# Patient Record
Sex: FEMALE | URBAN - METROPOLITAN AREA
[De-identification: names, ages, dates, MRNs, and addresses within clinical notes are randomized per-mention and may not be internally consistent; named-entity substitution may affect disease eponyms.]

---

## 2022-09-06 ENCOUNTER — APPOINTMENT (OUTPATIENT)
Dept: URBAN - METROPOLITAN AREA CLINIC 259 | Age: 47
Setting detail: DERMATOLOGY
End: 2022-09-06

## 2022-09-06 DIAGNOSIS — Z85.828 PERSONAL HISTORY OF OTHER MALIGNANT NEOPLASM OF SKIN: ICD-10-CM

## 2022-09-06 DIAGNOSIS — L81.4 OTHER MELANIN HYPERPIGMENTATION: ICD-10-CM

## 2022-09-06 DIAGNOSIS — D18.0 HEMANGIOMA: ICD-10-CM

## 2022-09-06 DIAGNOSIS — L57.8 OTHER SKIN CHANGES DUE TO CHRONIC EXPOSURE TO NONIONIZING RADIATION: ICD-10-CM

## 2022-09-06 DIAGNOSIS — L82.1 OTHER SEBORRHEIC KERATOSIS: ICD-10-CM

## 2022-09-06 DIAGNOSIS — D22 MELANOCYTIC NEVI: ICD-10-CM

## 2022-09-06 DIAGNOSIS — Z71.89 OTHER SPECIFIED COUNSELING: ICD-10-CM

## 2022-09-06 PROBLEM — D18.01 HEMANGIOMA OF SKIN AND SUBCUTANEOUS TISSUE: Status: ACTIVE | Noted: 2022-09-06

## 2022-09-06 PROBLEM — D22.5 MELANOCYTIC NEVI OF TRUNK: Status: ACTIVE | Noted: 2022-09-06

## 2022-09-06 PROCEDURE — OTHER COUNSELING: OTHER

## 2022-09-06 PROCEDURE — OTHER MIPS QUALITY: OTHER

## 2022-09-06 PROCEDURE — 99203 OFFICE O/P NEW LOW 30 MIN: CPT

## 2022-09-06 ASSESSMENT — LOCATION DETAILED DESCRIPTION DERM
LOCATION DETAILED: INFERIOR THORACIC SPINE
LOCATION DETAILED: RIGHT SUPERIOR MEDIAL UPPER BACK
LOCATION DETAILED: LEFT PROXIMAL PRETIBIAL REGION
LOCATION DETAILED: LEFT MEDIAL UPPER BACK
LOCATION DETAILED: LEFT INFERIOR MEDIAL UPPER BACK

## 2022-09-06 ASSESSMENT — LOCATION SIMPLE DESCRIPTION DERM
LOCATION SIMPLE: RIGHT UPPER BACK
LOCATION SIMPLE: LEFT UPPER BACK
LOCATION SIMPLE: LEFT PRETIBIAL REGION
LOCATION SIMPLE: UPPER BACK

## 2022-09-06 ASSESSMENT — LOCATION ZONE DERM
LOCATION ZONE: LEG
LOCATION ZONE: TRUNK

## 2022-09-06 NOTE — HPI: FULL BODY SKIN EXAMINATION
How Severe Are Your Spot(S)?: mild
What Type Of Note Output Would You Prefer (Optional)?: Standard Output
What Is The Reason For Today's Visit?: Full Body Skin Examination
What Is The Reason For Today's Visit? (Being Monitored For X): the development of new lesions
Additional History: Pt states she has some new spots, but declines tenderness or bleeding.

## 2023-03-06 PROCEDURE — 88305 TISSUE EXAM BY PATHOLOGIST: CPT | Mod: TC,ORL | Performed by: SPECIALIST

## 2023-03-06 PROCEDURE — 88305 TISSUE EXAM BY PATHOLOGIST: CPT | Mod: 26 | Performed by: PATHOLOGY

## 2023-03-07 ENCOUNTER — LAB REQUISITION (OUTPATIENT)
Dept: LAB | Facility: CLINIC | Age: 48
End: 2023-03-07
Payer: COMMERCIAL

## 2023-03-08 LAB
PATH REPORT.COMMENTS IMP SPEC: NORMAL
PATH REPORT.COMMENTS IMP SPEC: NORMAL
PATH REPORT.FINAL DX SPEC: NORMAL
PATH REPORT.GROSS SPEC: NORMAL
PATH REPORT.MICROSCOPIC SPEC OTHER STN: NORMAL
PATH REPORT.RELEVANT HX SPEC: NORMAL
PHOTO IMAGE: NORMAL

## 2025-04-11 ENCOUNTER — TRANSFERRED RECORDS (OUTPATIENT)
Dept: HEALTH INFORMATION MANAGEMENT | Facility: CLINIC | Age: 50
End: 2025-04-11

## 2025-04-22 ENCOUNTER — MEDICAL CORRESPONDENCE (OUTPATIENT)
Dept: HEALTH INFORMATION MANAGEMENT | Facility: CLINIC | Age: 50
End: 2025-04-22
Payer: COMMERCIAL

## 2025-04-23 ENCOUNTER — TRANSCRIBE ORDERS (OUTPATIENT)
Dept: OTHER | Age: 50
End: 2025-04-23

## 2025-04-23 DIAGNOSIS — J33.8 OTHER POLYP OF SINUS: Primary | ICD-10-CM

## 2025-04-23 NOTE — TELEPHONE ENCOUNTER
REFERRAL INFORMATION:  Referring By: Ernesto Holcomb   Referring Clinic: [facility]   Reason for Visit/Diagnosis: Referred to Dr. Ruma Cavanaugh. Polyp of nasal sinus. Chronis Sinus issues.  REF BY Provider: Ernesto Holcomb   Affiliated with: [facility]   IMAGING AT [facility] W/PT  CONF LOC    FUTURE VISIT INFORMATION:  Appointment Date: 7/28/25  Appointment Time: 9:30 AM    NOTES STATUS DETAILS   OFFICE NOTE from referring provider Epic  [facility]    Ernesto Holcomb    OFFICE NOTE from other specialist      HOSPITAL DISCHARGE / ER notes     PROCEDURE and/ or OPERATIVE REPORTS  *related (if applicable) or most recent*      LABS      PATHOLOGY REPORTS     PATHOLOGY SLIDES TRACKING   Tracking #:         IMAGES *pertaining images & report*       CT, MRI, PET, NM, US, XRAYS    IMAGING  DISC TRACKING   Tracking #:

## 2025-06-12 ENCOUNTER — RESULTS FOLLOW-UP (OUTPATIENT)
Dept: URGENT CARE | Facility: URGENT CARE | Age: 50
End: 2025-06-12

## 2025-07-28 ENCOUNTER — OFFICE VISIT (OUTPATIENT)
Dept: OTOLARYNGOLOGY | Facility: CLINIC | Age: 50
End: 2025-07-28
Payer: COMMERCIAL

## 2025-07-28 ENCOUNTER — PRE VISIT (OUTPATIENT)
Dept: OTOLARYNGOLOGY | Facility: CLINIC | Age: 50
End: 2025-07-28

## 2025-07-28 VITALS
DIASTOLIC BLOOD PRESSURE: 81 MMHG | BODY MASS INDEX: 26.48 KG/M2 | SYSTOLIC BLOOD PRESSURE: 121 MMHG | HEIGHT: 64 IN | WEIGHT: 155.1 LBS | OXYGEN SATURATION: 99 % | HEART RATE: 66 BPM

## 2025-07-28 DIAGNOSIS — J33.8 OTHER POLYP OF SINUS: ICD-10-CM

## 2025-07-28 PROCEDURE — 31231 NASAL ENDOSCOPY DX: CPT | Performed by: OTOLARYNGOLOGY

## 2025-07-28 PROCEDURE — 3079F DIAST BP 80-89 MM HG: CPT | Performed by: OTOLARYNGOLOGY

## 2025-07-28 PROCEDURE — 3074F SYST BP LT 130 MM HG: CPT | Performed by: OTOLARYNGOLOGY

## 2025-07-28 PROCEDURE — 99202 OFFICE O/P NEW SF 15 MIN: CPT | Mod: 25 | Performed by: OTOLARYNGOLOGY

## 2025-07-28 PROCEDURE — 1126F AMNT PAIN NOTED NONE PRSNT: CPT | Performed by: OTOLARYNGOLOGY

## 2025-07-28 RX ORDER — MUPIROCIN 2 %
OINTMENT (GRAM) TOPICAL
COMMUNITY
Start: 2023-09-08

## 2025-07-28 RX ORDER — MEDROXYPROGESTERONE ACETATE 5 MG
TABLET ORAL
COMMUNITY

## 2025-07-28 RX ORDER — TOBRAMYCIN AND DEXAMETHASONE 3; 1 MG/ML; MG/ML
SUSPENSION/ DROPS OPHTHALMIC
COMMUNITY

## 2025-07-28 RX ORDER — METHOCARBAMOL 500 MG/1
TABLET, FILM COATED ORAL
COMMUNITY

## 2025-07-28 ASSESSMENT — PAIN SCALES - GENERAL: PAINLEVEL_OUTOF10: NO PAIN (0)

## 2025-07-28 NOTE — PROGRESS NOTES
[facility] [address] [address]  Phone: [phone]  Fax: [phone]    Patient:  Tami Monique, Date of birth 1975  Date of Visit:  07/28/2025  Referring Provider Ernesto Holcomb      Assessment & Plan  Sinus issues (J32.9):  - Mild sinus inflammation observed, but unlikely to be the cause of pain.  - Set up an MRI to check for vascular issues or sinus inflammation. If MRI shows inflammation, consider a short course of prednisone.    Tingling in the tongue (R20.2):  - Unlikely related to sinus issues, if MRI is negative, recommend seeing neurology for further assessment.    Eye pain (H57.1):  - Possible referred pain from radiculopathy in the neck (occipital neuritis), but not classic presentation.  - If MRI is negative, recommend seeing neurology for further assessment.    Other polyp of sinus (J33.8):  - Consider surgery if other assessments do not reveal the cause of symptoms.     20 minutes spent by me on the date of the encounter doing chart review, history and exam, documentation and further activities per the note. This time is in addition to separately billable procedure.         Ruma Cavanaugh MD            Otolaryngology Adult Consultation    HPI: Tami Monique is a 49 year old female seen today in the Otolaryngology Clinic in consultation from Ernesto Holcomb for a history of R eye pain.      History of Present Illness-Tami Monique, 49-year-old female, reported sinus pressure and eye pain. Also described a tingling sensation in the tongue lasting about a week. Eye pain is described as behind eye, takes ibuprofen regularly for this. Has had head pain as well, noted symptoms feel worse on certain days, particularly after running, but no longer experiences these associated headaches. Denied neck pain. Takes Allegra occasionally for allergies. Uses nasal spray (flonase) as well, with some irritation noted. No  history of facial trauma. Eye doctor previously evaluated optic nerve and recommended eye drops (no change in symptoms); was advised to get checked for dry eye. Regular massages for neck tightness. No visual changes, no history of frequent sinus infections. Does feel some nasal congestion.     Current Outpatient Medications   Medication Sig Dispense Refill    amoxicillin-clavulanate (AUGMENTIN) 875-125 MG tablet Take 1 tablet by mouth.      medroxyPROGESTERone (PROVERA) 5 MG tablet       methocarbamol (ROBAXIN) 500 MG tablet TAKE 1 TABLET (500 MG) BY MOUTH AT BEDTIME AS NEEDED.      mupirocin (BACTROBAN) 2 % external ointment Apply topically.      tobramycin-dexAMETHasone (TOBRADEX) 0.3-0.1 % ophthalmic suspension INSTILL 1 DROP IN BOTH EYES 4 TIMES DAILY FOR 2 WEEKS       No current facility-administered medications for this visit.          Allergies   Allergen Reactions    Cat Dander      Other Reaction(s): Runny nose    Animal dander    Cat Hair Extract      Other Reaction(s): Not available    cat hair extract    Dust Mite Extract      Other Reaction(s): Not available, Runny nose    mite extract    Molds & Smuts Nausea     Other Reaction(s): Runny nose    mold extract       No past medical history on file.    Social History     Occupational History    Not on file   Tobacco Use    Smoking status: Not on file    Smokeless tobacco: Not on file   Substance and Sexual Activity    Alcohol use: Not on file    Drug use: Not on file    Sexual activity: Not on file        Review of Systems      7/27/2025     7:58 PM    ENT ROS   Neurology Headache   Ears, Nose, Throat Nasal congestion or drainage        14 point ROS neg other than the symptoms noted above.    Physical Exam:    Physical Exam - no distress, normal face.   - HEENT: Nasal examination showed healthy appearance; ears appeared normal; oral examination normal, palpation showed no temporomandibular joint dysfunction, jaw joint without clicking, salivary glands  normal; neck examination revealed normal muscles, no lymphadenopathy, normal thyroid.      Procedure:  Endoscopy indicated for exam of nasal cavity and sinuses to identify cause of pain.  Topical anesthetic/decongestant spray applied.  Rigid scope used for visualization. I inspected the interior of the nasal cavity and the middle and superior meatus, the turbinates, and the sphenoethmoid recess.  Findings: normal exam, healthy mucosa, no lesions, polyps or sign of infection.     Imaging:

## 2025-07-28 NOTE — PATIENT INSTRUCTIONS
You were seen in the ENT Clinic today by Dr. Cavanaugh. If you have any questions or concerns after your appointment, please contact us (see below)       2.   The following recommendations have been made based upon your appointment today:  Please schedule MRI      3.   Plan to return to the ENT clinic dependent on MRI results           How to Contact Us:  Send a [employer] message to your provider. Our team will respond to you via [employer]. Occasionally, we will need to call you to get further information.  For urgent matters (Monday-Friday), call the ENT Clinic: [phone] and speak with a call center team member - they will route your call appropriately.   If you'd like to speak directly with a nurse, please find our contact information below. We do our best to check voicemail frequently throughout the day, and will work to call you back within 1-2 days. For urgent matters, please use the general clinic phone numbers listed above.     Afshan LAURENT RN  Direct: [phone]  Lesli UMANZOR LPN  Direct: [phone]         [facility]  Department of Otolaryngology

## 2025-07-28 NOTE — LETTER
7/28/2025       RE: Tami Monique  [address]  [facility] [address]     Dear Colleague,    Thank you for referring your patient, Tami Monique, to the [facility] [place] at [facility]. Please see a copy of my visit note below.      [facility] [address]  [address] FLO[address]  [facility] [address]  Phone: [phone]  Fax: [phone]    Patient:  Tami Monique, Date of birth 1975  Date of Visit:  07/28/2025  Referring Provider Ernesto Holcomb      Assessment & Plan  Sinus issues (J32.9):  - Mild sinus inflammation observed, but unlikely to be the cause of pain.  - Set up an MRI to check for vascular issues or sinus inflammation. If MRI shows inflammation, consider a short course of prednisone.    Tingling in the tongue (R20.2):  - Unlikely related to sinus issues, if MRI is negative, recommend seeing neurology for further assessment.    Eye pain (H57.1):  - Possible referred pain from radiculopathy in the neck (occipital neuritis), but not classic presentation.  - If MRI is negative, recommend seeing neurology for further assessment.    Other polyp of sinus (J33.8):  - Consider surgery if other assessments do not reveal the cause of symptoms.     20 minutes spent by me on the date of the encounter doing chart review, history and exam, documentation and further activities per the note. This time is in addition to separately billable procedure.         Ruma Cavanaugh MD            Otolaryngology Adult Consultation    HPI: Tami Monique is a 49 year old female seen today in the Otolaryngology Clinic in consultation from Ernesto Holcomb for a history of R eye pain.      History of Present Illness-Tami Monique, 49-year-old female, reported sinus pressure and eye pain. Also described a tingling sensation in the tongue lasting about a week. Eye pain is described as behind eye,  takes ibuprofen regularly for this. Has had head pain as well, noted symptoms feel worse on certain days, particularly after running, but no longer experiences these associated headaches. Denied neck pain. Takes Allegra occasionally for allergies. Uses nasal spray (flonase) as well, with some irritation noted. No history of facial trauma. Eye doctor previously evaluated optic nerve and recommended eye drops (no change in symptoms); was advised to get checked for dry eye. Regular massages for neck tightness. No visual changes, no history of frequent sinus infections. Does feel some nasal congestion.     Current Outpatient Medications   Medication Sig Dispense Refill     amoxicillin-clavulanate (AUGMENTIN) 875-125 MG tablet Take 1 tablet by mouth.       medroxyPROGESTERone (PROVERA) 5 MG tablet        methocarbamol (ROBAXIN) 500 MG tablet TAKE 1 TABLET (500 MG) BY MOUTH AT BEDTIME AS NEEDED.       mupirocin (BACTROBAN) 2 % external ointment Apply topically.       tobramycin-dexAMETHasone (TOBRADEX) 0.3-0.1 % ophthalmic suspension INSTILL 1 DROP IN BOTH EYES 4 TIMES DAILY FOR 2 WEEKS       No current facility-administered medications for this visit.          Allergies   Allergen Reactions     Cat Dander      Other Reaction(s): Runny nose    Animal dander     Cat Hair Extract      Other Reaction(s): Not available    cat hair extract     Dust Mite Extract      Other Reaction(s): Not available, Runny nose    mite extract     Molds & Smuts Nausea     Other Reaction(s): Runny nose    mold extract       No past medical history on file.    Social History     Occupational History     Not on file   Tobacco Use     Smoking status: Not on file     Smokeless tobacco: Not on file   Substance and Sexual Activity     Alcohol use: Not on file     Drug use: Not on file     Sexual activity: Not on file        Review of Systems      7/27/2025     7:58 PM    ENT ROS   Neurology Headache   Ears, Nose, Throat Nasal congestion or drainage         14 point ROS neg other than the symptoms noted above.    Physical Exam:    Physical Exam - no distress, normal face.   - HEENT: Nasal examination showed healthy appearance; ears appeared normal; oral examination normal, palpation showed no temporomandibular joint dysfunction, jaw joint without clicking, salivary glands normal; neck examination revealed normal muscles, no lymphadenopathy, normal thyroid.      Procedure:  Endoscopy indicated for exam of nasal cavity and sinuses to identify cause of pain.  Topical anesthetic/decongestant spray applied.  Rigid scope used for visualization. I inspected the interior of the nasal cavity and the middle and superior meatus, the turbinates, and the sphenoethmoid recess.  Findings: normal exam, healthy mucosa, no lesions, polyps or sign of infection.     Imaging:          Again, thank you for allowing me to participate in the care of your patient.      Sincerely,    Ruma Cavanaugh MD

## 2025-07-28 NOTE — NURSING NOTE
"Chief Complaint   Patient presents with    Consult   Blood pressure 121/81, pulse 66, height 1.626 m (5' 4\"), weight 70.4 kg (155 lb 1.6 oz), SpO2 99%. Kali Sosa, EMT    "

## 2025-07-29 ENCOUNTER — ANCILLARY PROCEDURE (OUTPATIENT)
Dept: MRI IMAGING | Facility: CLINIC | Age: 50
End: 2025-07-29
Attending: OTOLARYNGOLOGY
Payer: COMMERCIAL

## 2025-07-29 DIAGNOSIS — J33.8 OTHER POLYP OF SINUS: ICD-10-CM

## 2025-07-29 PROCEDURE — A9585 GADOBUTROL INJECTION: HCPCS | Performed by: RADIOLOGY

## 2025-07-29 PROCEDURE — 70543 MRI ORBT/FAC/NCK W/O &W/DYE: CPT | Mod: GC | Performed by: RADIOLOGY

## 2025-07-29 RX ORDER — GADOBUTROL 604.72 MG/ML
7 INJECTION INTRAVENOUS ONCE
Status: COMPLETED | OUTPATIENT
Start: 2025-07-29 | End: 2025-07-29

## 2025-07-29 RX ADMIN — GADOBUTROL 7 ML: 604.72 INJECTION INTRAVENOUS at 10:31

## 2025-08-02 ENCOUNTER — HEALTH MAINTENANCE LETTER (OUTPATIENT)
Age: 50
End: 2025-08-02